# Patient Record
Sex: FEMALE | Race: WHITE
[De-identification: names, ages, dates, MRNs, and addresses within clinical notes are randomized per-mention and may not be internally consistent; named-entity substitution may affect disease eponyms.]

---

## 2018-04-15 ENCOUNTER — FORM ENCOUNTER (OUTPATIENT)
Age: 73
End: 2018-04-15

## 2018-04-16 ENCOUNTER — APPOINTMENT (OUTPATIENT)
Dept: FAMILY MEDICINE | Facility: CLINIC | Age: 73
End: 2018-04-16
Payer: MEDICARE

## 2018-04-16 ENCOUNTER — OUTPATIENT (OUTPATIENT)
Dept: OUTPATIENT SERVICES | Facility: HOSPITAL | Age: 73
LOS: 1 days | End: 2018-04-16
Payer: MEDICARE

## 2018-04-16 VITALS
HEART RATE: 65 BPM | DIASTOLIC BLOOD PRESSURE: 70 MMHG | BODY MASS INDEX: 26.08 KG/M2 | HEIGHT: 63.25 IN | RESPIRATION RATE: 16 BRPM | OXYGEN SATURATION: 100 % | SYSTOLIC BLOOD PRESSURE: 142 MMHG | WEIGHT: 149 LBS

## 2018-04-16 DIAGNOSIS — M16.11 UNILATERAL PRIMARY OSTEOARTHRITIS, RIGHT HIP: ICD-10-CM

## 2018-04-16 DIAGNOSIS — Z00.8 ENCOUNTER FOR OTHER GENERAL EXAMINATION: ICD-10-CM

## 2018-04-16 PROCEDURE — 73502 X-RAY EXAM HIP UNI 2-3 VIEWS: CPT

## 2018-04-16 PROCEDURE — 99214 OFFICE O/P EST MOD 30 MIN: CPT

## 2018-04-16 PROCEDURE — 73502 X-RAY EXAM HIP UNI 2-3 VIEWS: CPT | Mod: 26,RT

## 2018-04-16 RX ORDER — CHOLECALCIFEROL (VITAMIN D3) 25 MCG
TABLET ORAL
Refills: 0 | Status: ACTIVE | COMMUNITY

## 2018-04-16 RX ORDER — IBUPROFEN 800 MG/1
TABLET, FILM COATED ORAL
Refills: 0 | Status: ACTIVE | COMMUNITY

## 2018-04-16 RX ORDER — OMEPRAZOLE 20 MG/1
20 TABLET, DELAYED RELEASE ORAL
Qty: 60 | Refills: 1 | Status: ACTIVE | COMMUNITY
Start: 2018-04-16 | End: 1900-01-01

## 2018-04-16 RX ORDER — ACETAMINOPHEN 325 MG/1
TABLET, FILM COATED ORAL
Refills: 0 | Status: ACTIVE | COMMUNITY

## 2018-04-16 RX ORDER — MULTIVITAMIN
TABLET ORAL
Refills: 0 | Status: ACTIVE | COMMUNITY

## 2018-04-16 RX ORDER — MELOXICAM 15 MG/1
15 TABLET ORAL
Qty: 30 | Refills: 3 | Status: ACTIVE | COMMUNITY
Start: 2018-04-16 | End: 1900-01-01

## 2018-08-26 ENCOUNTER — TRANSCRIPTION ENCOUNTER (OUTPATIENT)
Age: 73
End: 2018-08-26

## 2019-11-18 ENCOUNTER — APPOINTMENT (OUTPATIENT)
Dept: FAMILY MEDICINE | Facility: CLINIC | Age: 74
End: 2019-11-18

## 2020-07-27 ENCOUNTER — APPOINTMENT (OUTPATIENT)
Dept: FAMILY MEDICINE | Facility: CLINIC | Age: 75
End: 2020-07-27
Payer: MEDICARE

## 2020-07-27 VITALS
DIASTOLIC BLOOD PRESSURE: 72 MMHG | TEMPERATURE: 98.7 F | SYSTOLIC BLOOD PRESSURE: 150 MMHG | WEIGHT: 130 LBS | BODY MASS INDEX: 22.75 KG/M2 | OXYGEN SATURATION: 98 % | HEIGHT: 63.25 IN | HEART RATE: 82 BPM

## 2020-07-27 DIAGNOSIS — N39.0 URINARY TRACT INFECTION, SITE NOT SPECIFIED: ICD-10-CM

## 2020-07-27 LAB
BILIRUB UR QL STRIP: NEGATIVE
CLARITY UR: CLEAR
COLLECTION METHOD: NORMAL
GLUCOSE UR-MCNC: NEGATIVE
HCG UR QL: 0.2 EU/DL
HGB UR QL STRIP.AUTO: NORMAL
KETONES UR-MCNC: NEGATIVE
LEUKOCYTE ESTERASE UR QL STRIP: NORMAL
NITRITE UR QL STRIP: NEGATIVE
PH UR STRIP: 5.5
PROT UR STRIP-MCNC: NEGATIVE
SP GR UR STRIP: 1.01

## 2020-07-27 PROCEDURE — 99214 OFFICE O/P EST MOD 30 MIN: CPT | Mod: 25

## 2020-07-27 PROCEDURE — 81002 URINALYSIS NONAUTO W/O SCOPE: CPT

## 2020-07-27 RX ORDER — CIPROFLOXACIN HYDROCHLORIDE 500 MG/1
500 TABLET, FILM COATED ORAL
Qty: 10 | Refills: 0 | Status: ACTIVE | COMMUNITY
Start: 2020-07-27 | End: 1900-01-01

## 2020-07-28 RX ORDER — LETROZOLE TABLETS 2.5 MG/1
2.5 TABLET, FILM COATED ORAL
Qty: 90 | Refills: 0 | Status: ACTIVE | COMMUNITY
Start: 2020-01-13

## 2020-07-30 LAB — BACTERIA UR CULT: NORMAL

## 2020-12-23 PROBLEM — N39.0 ACUTE UTI: Status: RESOLVED | Noted: 2020-07-27 | Resolved: 2020-12-23

## 2023-09-15 ENCOUNTER — NON-APPOINTMENT (OUTPATIENT)
Age: 78
End: 2023-09-15

## 2023-12-26 ENCOUNTER — NON-APPOINTMENT (OUTPATIENT)
Age: 78
End: 2023-12-26

## 2024-03-13 ENCOUNTER — APPOINTMENT (OUTPATIENT)
Dept: FAMILY MEDICINE | Facility: CLINIC | Age: 79
End: 2024-03-13
Payer: MEDICARE

## 2024-03-13 VITALS
OXYGEN SATURATION: 96 % | DIASTOLIC BLOOD PRESSURE: 80 MMHG | BODY MASS INDEX: 22.75 KG/M2 | RESPIRATION RATE: 16 BRPM | HEART RATE: 75 BPM | HEIGHT: 63.25 IN | WEIGHT: 130 LBS | TEMPERATURE: 98 F | SYSTOLIC BLOOD PRESSURE: 140 MMHG

## 2024-03-13 DIAGNOSIS — L08.9 LOCAL INFECTION OF THE SKIN AND SUBCUTANEOUS TISSUE, UNSPECIFIED: ICD-10-CM

## 2024-03-13 PROCEDURE — 99213 OFFICE O/P EST LOW 20 MIN: CPT

## 2024-03-13 RX ORDER — CEPHALEXIN 500 MG/1
500 TABLET ORAL EVERY 6 HOURS
Qty: 28 | Refills: 0 | Status: ACTIVE | COMMUNITY
Start: 2024-03-13 | End: 1900-01-01

## 2024-03-13 NOTE — HISTORY OF PRESENT ILLNESS
[FreeTextEntry8] : 77yo female who presents to the office with complaint of toe infection.  Patient reports that since January 2024 she has been dealing with redness and pain in the right great toe. Reports progression of redness and pain over time. She was applying triple antibiotic cream and hydrogen peroxide without benefit. Does have history of toenail fungus. Denies striking the area or any trauma to region. Denies pus or drainage. Tender on the medial aspect of the nail.   Of note, patient recently given Augmentin for upper respiratory infection which she was able to tolerate well, even with history of amoxicillin allergy.

## 2024-03-13 NOTE — ASSESSMENT
[FreeTextEntry1] : #Toe infection - Presenting with two months of right great toe infection, worsening - Failed to improve with triple antibiotic ointment and hydrogen peroxide - Erythema of the cuticle and medial aspect of great toe with tenderness - Start Keflex - Recommend podiatry follow up

## 2024-03-13 NOTE — REVIEW OF SYSTEMS
[Fever] : no fever [Chills] : no chills [Itching] : no itching [Nail Changes] : nail changes [Skin Rash] : skin rash

## 2024-03-13 NOTE — PHYSICAL EXAM
[No Acute Distress] : no acute distress [Well-Appearing] : well-appearing [de-identified] : erythema at the cuticle and medial aspect of right great toe, no pus or drainage, tender

## 2024-03-13 NOTE — PHYSICAL EXAM
[No Acute Distress] : no acute distress [Well-Appearing] : well-appearing [de-identified] : erythema at the cuticle and medial aspect of right great toe, no pus or drainage, tender

## 2024-11-07 ENCOUNTER — NON-APPOINTMENT (OUTPATIENT)
Age: 79
End: 2024-11-07

## 2025-01-09 ENCOUNTER — OFFICE (OUTPATIENT)
Dept: URBAN - METROPOLITAN AREA CLINIC 12 | Facility: CLINIC | Age: 80
Setting detail: OPHTHALMOLOGY
End: 2025-01-09
Payer: MEDICARE

## 2025-01-09 DIAGNOSIS — H25.13: ICD-10-CM

## 2025-01-09 DIAGNOSIS — D31.30: ICD-10-CM

## 2025-01-09 PROCEDURE — 99204 OFFICE O/P NEW MOD 45 MIN: CPT | Performed by: OPHTHALMOLOGY

## 2025-01-09 PROCEDURE — 92250 FUNDUS PHOTOGRAPHY W/I&R: CPT | Performed by: OPHTHALMOLOGY

## 2025-01-09 ASSESSMENT — REFRACTION_MANIFEST
OS_AXIS: 076
OD_CYLINDER: -0.75
OS_SPHERE: PLANO
OS_VA1: 20/25-2
OD_SPHERE: -4.50
OS_CYLINDER: -0.75
OD_AXIS: 112

## 2025-01-09 ASSESSMENT — KERATOMETRY
OS_K1POWER_DIOPTERS: 43.50
OD_K1POWER_DIOPTERS: 43.50
OS_AXISANGLE_DEGREES: 139
OD_K2POWER_DIOPTERS: 44.00
OS_K2POWER_DIOPTERS: 43.75
OD_AXISANGLE_DEGREES: 066

## 2025-01-09 ASSESSMENT — REFRACTION_CURRENTRX
OD_OVR_VA: 20/
OS_ADD: +2.75
OD_AXIS: 092
OS_SPHERE: +0.25
OS_CYLINDER: -0.50
OD_ADD: +2.75
OS_AXIS: 084
OD_CYLINDER: -0.50
OD_VPRISM_DIRECTION: PROGS
OS_OVR_VA: 20/
OD_SPHERE: -0.75
OS_VPRISM_DIRECTION: PROGS

## 2025-01-09 ASSESSMENT — VISUAL ACUITY
OD_BCVA: 20/40-
OS_BCVA: 20/50-

## 2025-01-09 ASSESSMENT — TONOMETRY
OS_IOP_MMHG: 20
OD_IOP_MMHG: 20

## 2025-01-09 ASSESSMENT — CONFRONTATIONAL VISUAL FIELD TEST (CVF)
OS_FINDINGS: FULL
OD_FINDINGS: FULL

## 2025-01-09 ASSESSMENT — REFRACTION_AUTOREFRACTION
OS_AXIS: 076
OS_CYLINDER: -0.75
OS_SPHERE: PLANO
OD_AXIS: 112
OD_SPHERE: -4.50
OD_CYLINDER: -0.75

## 2025-02-10 ENCOUNTER — OFFICE (OUTPATIENT)
Dept: URBAN - METROPOLITAN AREA CLINIC 12 | Facility: CLINIC | Age: 80
Setting detail: OPHTHALMOLOGY
End: 2025-02-10
Payer: MEDICARE

## 2025-02-10 DIAGNOSIS — H25.11: ICD-10-CM

## 2025-02-10 DIAGNOSIS — H25.13: ICD-10-CM

## 2025-02-10 PROCEDURE — 92136 OPHTHALMIC BIOMETRY: CPT | Mod: TC | Performed by: OPHTHALMOLOGY

## 2025-02-10 PROCEDURE — 92136 OPHTHALMIC BIOMETRY: CPT | Mod: 26,RT | Performed by: OPHTHALMOLOGY

## 2025-02-10 PROCEDURE — 99213 OFFICE O/P EST LOW 20 MIN: CPT | Performed by: OPHTHALMOLOGY

## 2025-02-10 ASSESSMENT — REFRACTION_MANIFEST
OS_CYLINDER: -0.75
OD_SPHERE: -4.50
OS_VA1: 20/25-2
OD_AXIS: 112
OS_AXIS: 076
OD_CYLINDER: -0.75
OS_SPHERE: PLANO

## 2025-02-10 ASSESSMENT — KERATOMETRY
OS_K1POWER_DIOPTERS: 43.25
OS_AXISANGLE_DEGREES: 144
OD_K2POWER_DIOPTERS: 44.00
OD_K1POWER_DIOPTERS: 43.50
OD_AXISANGLE_DEGREES: 009
OS_K2POWER_DIOPTERS: 43.75

## 2025-02-10 ASSESSMENT — VISUAL ACUITY
OD_BCVA: 20/40-2
OS_BCVA: 20/60+

## 2025-02-10 ASSESSMENT — CONFRONTATIONAL VISUAL FIELD TEST (CVF)
OD_FINDINGS: FULL
OS_FINDINGS: FULL

## 2025-02-10 ASSESSMENT — REFRACTION_AUTOREFRACTION
OS_SPHERE: -0.25
OD_AXIS: 092
OD_SPHERE: -4.50
OS_AXIS: 075
OD_CYLINDER: -1.00
OS_CYLINDER: -0.75

## 2025-02-10 ASSESSMENT — REFRACTION_CURRENTRX
OD_ADD: +2.75
OS_VPRISM_DIRECTION: PROGS
OS_ADD: +2.75
OD_AXIS: 092
OS_OVR_VA: 20/
OD_SPHERE: -0.75
OD_CYLINDER: -0.50
OS_CYLINDER: -0.50
OS_AXIS: 084
OD_VPRISM_DIRECTION: PROGS
OS_SPHERE: +0.25
OD_OVR_VA: 20/

## 2025-02-10 ASSESSMENT — TONOMETRY
OS_IOP_MMHG: 20
OD_IOP_MMHG: 20

## 2025-02-13 ENCOUNTER — APPOINTMENT (OUTPATIENT)
Dept: FAMILY MEDICINE | Facility: CLINIC | Age: 80
End: 2025-02-13
Payer: MEDICARE

## 2025-02-13 VITALS
HEART RATE: 78 BPM | TEMPERATURE: 97.7 F | BODY MASS INDEX: 22.57 KG/M2 | SYSTOLIC BLOOD PRESSURE: 132 MMHG | DIASTOLIC BLOOD PRESSURE: 70 MMHG | WEIGHT: 129 LBS | OXYGEN SATURATION: 95 % | HEIGHT: 63.25 IN

## 2025-02-13 DIAGNOSIS — Z01.818 ENCOUNTER FOR OTHER PREPROCEDURAL EXAMINATION: ICD-10-CM

## 2025-02-13 DIAGNOSIS — H26.9 UNSPECIFIED CATARACT: ICD-10-CM

## 2025-02-13 DIAGNOSIS — M81.0 AGE-RELATED OSTEOPOROSIS W/OUT CURRENT PATHOLOGICAL FRACTURE: ICD-10-CM

## 2025-02-13 PROCEDURE — 99214 OFFICE O/P EST MOD 30 MIN: CPT

## 2025-02-13 RX ORDER — DENOSUMAB 60 MG/ML
60 INJECTION SUBCUTANEOUS
Qty: 1 | Refills: 0 | Status: ACTIVE | COMMUNITY
Start: 2025-02-13

## 2025-03-04 ENCOUNTER — ASC (OUTPATIENT)
Dept: URBAN - METROPOLITAN AREA SURGERY 8 | Facility: SURGERY | Age: 80
Setting detail: OPHTHALMOLOGY
End: 2025-03-04
Payer: MEDICARE

## 2025-03-04 DIAGNOSIS — H25.11: ICD-10-CM

## 2025-03-04 PROCEDURE — 68841 INSJ RX ELUT IMPLT LAC CANAL: CPT | Mod: RT | Performed by: OPHTHALMOLOGY

## 2025-03-04 PROCEDURE — 66984 XCAPSL CTRC RMVL W/O ECP: CPT | Mod: RT | Performed by: OPHTHALMOLOGY

## 2025-03-05 ENCOUNTER — RX ONLY (RX ONLY)
Age: 80
End: 2025-03-05

## 2025-03-05 ENCOUNTER — OFFICE (OUTPATIENT)
Dept: URBAN - METROPOLITAN AREA CLINIC 12 | Facility: CLINIC | Age: 80
Setting detail: OPHTHALMOLOGY
End: 2025-03-05
Payer: MEDICARE

## 2025-03-05 DIAGNOSIS — Z96.1: ICD-10-CM

## 2025-03-05 PROCEDURE — 99024 POSTOP FOLLOW-UP VISIT: CPT | Performed by: OPTOMETRIST

## 2025-03-05 ASSESSMENT — REFRACTION_CURRENTRX
OD_CYLINDER: -0.50
OS_SPHERE: +0.25
OS_ADD: +2.75
OD_AXIS: 092
OD_SPHERE: -0.75
OS_OVR_VA: 20/
OD_OVR_VA: 20/
OD_ADD: +2.75
OS_VPRISM_DIRECTION: PROGS
OD_VPRISM_DIRECTION: PROGS
OS_AXIS: 084
OS_CYLINDER: -0.50

## 2025-03-05 ASSESSMENT — REFRACTION_MANIFEST
OD_SPHERE: -4.50
OS_SPHERE: PLANO
OS_CYLINDER: -0.75
OS_VA1: 20/25-2
OD_AXIS: 112
OD_CYLINDER: -0.75
OS_AXIS: 076

## 2025-03-05 ASSESSMENT — VISUAL ACUITY
OS_BCVA: 20/25
OD_BCVA: 20/40-2

## 2025-03-05 ASSESSMENT — KERATOMETRY
OS_K2POWER_DIOPTERS: 44.00
OD_AXISANGLE_DEGREES: 127
OS_AXISANGLE_DEGREES: 134
OS_K1POWER_DIOPTERS: 43.50
OD_K1POWER_DIOPTERS: 44.00
OD_K2POWER_DIOPTERS: 45.00

## 2025-03-05 ASSESSMENT — REFRACTION_AUTOREFRACTION
OD_CYLINDER: -1.00
OD_SPHERE: -0.25
OS_CYLINDER: -0.50
OS_AXIS: 70
OD_AXIS: 20
OS_SPHERE: -0.25

## 2025-03-05 ASSESSMENT — CONFRONTATIONAL VISUAL FIELD TEST (CVF)
OD_FINDINGS: FULL
OS_FINDINGS: FULL

## 2025-03-10 ENCOUNTER — OFFICE (OUTPATIENT)
Dept: URBAN - METROPOLITAN AREA CLINIC 12 | Facility: CLINIC | Age: 80
Setting detail: OPHTHALMOLOGY
End: 2025-03-10
Payer: MEDICARE

## 2025-03-10 DIAGNOSIS — H25.12: ICD-10-CM

## 2025-03-10 PROCEDURE — 92136 OPHTHALMIC BIOMETRY: CPT | Mod: 26,LT | Performed by: OPHTHALMOLOGY

## 2025-03-10 ASSESSMENT — REFRACTION_CURRENTRX
OD_CYLINDER: -0.50
OD_AXIS: 092
OS_CYLINDER: -0.50
OD_SPHERE: -0.75
OS_ADD: +2.75
OS_AXIS: 084
OS_VPRISM_DIRECTION: PROGS
OD_OVR_VA: 20/
OS_SPHERE: +0.25
OD_ADD: +2.75
OD_VPRISM_DIRECTION: PROGS
OS_OVR_VA: 20/

## 2025-03-10 ASSESSMENT — REFRACTION_MANIFEST
OS_CYLINDER: -0.75
OD_SPHERE: -4.50
OS_AXIS: 076
OD_AXIS: 112
OS_VA1: 20/25-2
OD_CYLINDER: -0.75
OS_SPHERE: PLANO

## 2025-03-10 ASSESSMENT — TONOMETRY
OD_IOP_MMHG: 20
OS_IOP_MMHG: 20

## 2025-03-10 ASSESSMENT — REFRACTION_AUTOREFRACTION
OD_CYLINDER: -1.00
OS_SPHERE: -0.50
OS_AXIS: 68
OD_AXIS: 171
OS_CYLINDER: -0.75
OD_SPHERE: -0.50

## 2025-03-10 ASSESSMENT — CONFRONTATIONAL VISUAL FIELD TEST (CVF)
OS_FINDINGS: FULL
OD_FINDINGS: FULL

## 2025-03-10 ASSESSMENT — KERATOMETRY
OS_AXISANGLE_DEGREES: 110
OD_K2POWER_DIOPTERS: 45.25
OS_K2POWER_DIOPTERS: 44.00
OD_AXISANGLE_DEGREES: 82
OD_K1POWER_DIOPTERS: 43.75
OS_K1POWER_DIOPTERS: 43.50

## 2025-03-10 ASSESSMENT — VISUAL ACUITY
OS_BCVA: 20/50-2
OD_BCVA: 20/50

## 2025-03-18 ENCOUNTER — ASC (OUTPATIENT)
Dept: URBAN - METROPOLITAN AREA SURGERY 8 | Facility: SURGERY | Age: 80
Setting detail: OPHTHALMOLOGY
End: 2025-03-18
Payer: MEDICARE

## 2025-03-18 DIAGNOSIS — H25.12: ICD-10-CM

## 2025-03-18 PROCEDURE — 66984 XCAPSL CTRC RMVL W/O ECP: CPT | Mod: 79,LT | Performed by: OPHTHALMOLOGY

## 2025-03-18 PROCEDURE — 68841 INSJ RX ELUT IMPLT LAC CANAL: CPT | Mod: 79,LT | Performed by: OPHTHALMOLOGY

## 2025-03-19 ENCOUNTER — OFFICE (OUTPATIENT)
Dept: URBAN - METROPOLITAN AREA CLINIC 12 | Facility: CLINIC | Age: 80
Setting detail: OPHTHALMOLOGY
End: 2025-03-19
Payer: MEDICARE

## 2025-03-19 DIAGNOSIS — Z96.1: ICD-10-CM

## 2025-03-19 PROCEDURE — 99024 POSTOP FOLLOW-UP VISIT: CPT | Performed by: OPTOMETRIST

## 2025-03-19 ASSESSMENT — VISUAL ACUITY
OD_BCVA: 20/25-2
OS_BCVA: 20/40

## 2025-03-19 ASSESSMENT — REFRACTION_CURRENTRX
OD_VPRISM_DIRECTION: PROGS
OD_ADD: +2.75
OD_CYLINDER: -0.50
OS_ADD: +2.75
OD_OVR_VA: 20/
OS_OVR_VA: 20/
OS_SPHERE: +0.25
OD_SPHERE: -0.75
OS_AXIS: 084
OS_VPRISM_DIRECTION: PROGS
OD_AXIS: 092
OS_CYLINDER: -0.50

## 2025-03-19 ASSESSMENT — KERATOMETRY
OS_K2POWER_DIOPTERS: 44.00
OD_K2POWER_DIOPTERS: 44.50
OS_AXISANGLE_DEGREES: 064
OD_AXISANGLE_DEGREES: 059
OD_K1POWER_DIOPTERS: 43.75
OS_K1POWER_DIOPTERS: 43.50

## 2025-03-19 ASSESSMENT — REFRACTION_MANIFEST
OD_AXIS: 112
OD_SPHERE: -4.50
OS_SPHERE: PLANO
OS_CYLINDER: -0.75
OS_AXIS: 076
OD_CYLINDER: -0.75
OS_VA1: 20/25-2

## 2025-03-19 ASSESSMENT — REFRACTION_AUTOREFRACTION
OD_SPHERE: +0.25
OS_AXIS: 041
OD_AXIS: 132
OS_CYLINDER: -0.75
OS_SPHERE: +0.75
OD_CYLINDER: -1.00

## 2025-03-19 ASSESSMENT — CONFRONTATIONAL VISUAL FIELD TEST (CVF)
OS_FINDINGS: FULL
OD_FINDINGS: FULL

## 2025-03-19 ASSESSMENT — TONOMETRY
OS_IOP_MMHG: 21
OD_IOP_MMHG: 16

## 2025-03-27 ENCOUNTER — OFFICE (OUTPATIENT)
Dept: URBAN - METROPOLITAN AREA CLINIC 12 | Facility: CLINIC | Age: 80
Setting detail: OPHTHALMOLOGY
End: 2025-03-27
Payer: MEDICARE

## 2025-03-27 DIAGNOSIS — Z96.1: ICD-10-CM

## 2025-03-27 PROCEDURE — 99024 POSTOP FOLLOW-UP VISIT: CPT | Performed by: OPHTHALMOLOGY

## 2025-03-27 ASSESSMENT — REFRACTION_CURRENTRX
OS_CYLINDER: -0.50
OS_ADD: +2.75
OS_VPRISM_DIRECTION: PROGS
OS_AXIS: 084
OD_ADD: +2.75
OD_CYLINDER: -0.50
OD_SPHERE: -0.75
OS_SPHERE: +0.25
OS_OVR_VA: 20/
OD_VPRISM_DIRECTION: PROGS
OD_AXIS: 092
OD_OVR_VA: 20/

## 2025-03-27 ASSESSMENT — REFRACTION_MANIFEST
OS_AXIS: 076
OD_CYLINDER: -0.75
OS_SPHERE: PLANO
OD_AXIS: 112
OS_CYLINDER: -0.75
OS_VA1: 20/25-2
OD_SPHERE: -4.50

## 2025-03-27 ASSESSMENT — TONOMETRY
OD_IOP_MMHG: 16
OS_IOP_MMHG: 17

## 2025-03-27 ASSESSMENT — KERATOMETRY
OS_K1POWER_DIOPTERS: 43.75
OD_K1POWER_DIOPTERS: 43.75
OS_AXISANGLE_DEGREES: 115
OD_K2POWER_DIOPTERS: 44.50
OS_K2POWER_DIOPTERS: 44.50
OD_AXISANGLE_DEGREES: 064

## 2025-03-27 ASSESSMENT — VISUAL ACUITY
OD_BCVA: 20/40-1
OS_BCVA: 20/25-2

## 2025-03-27 ASSESSMENT — REFRACTION_AUTOREFRACTION
OS_SPHERE: +0.25
OD_AXIS: 124
OD_CYLINDER: -1.00
OS_CYLINDER: -1.00
OS_AXIS: 047
OD_SPHERE: +0.25

## 2025-03-27 ASSESSMENT — CONFRONTATIONAL VISUAL FIELD TEST (CVF)
OD_FINDINGS: FULL
OS_FINDINGS: FULL

## 2025-04-11 ASSESSMENT — KERATOMETRY
OS_K2POWER_DIOPTERS: 43.75
OS_AXISANGLE_DEGREES: 139
OD_AXISANGLE_DEGREES: 066
OD_K1POWER_DIOPTERS: 43.50
OD_K2POWER_DIOPTERS: 44.00
OS_K1POWER_DIOPTERS: 43.50

## 2025-04-11 ASSESSMENT — REFRACTION_MANIFEST
OS_VA1: 20/25-2
OD_CYLINDER: -0.75
OS_AXIS: 076
OD_SPHERE: -4.50
OS_SPHERE: PLANO
OD_AXIS: 112
OS_CYLINDER: -0.75

## 2025-04-11 ASSESSMENT — REFRACTION_CURRENTRX
OD_VPRISM_DIRECTION: PROGS
OS_OVR_VA: 20/
OS_CYLINDER: -0.50
OD_CYLINDER: -0.50
OS_SPHERE: +0.25
OS_AXIS: 084
OD_AXIS: 092
OD_SPHERE: -0.75
OD_OVR_VA: 20/
OD_ADD: +2.75
OS_VPRISM_DIRECTION: PROGS
OS_ADD: +2.75

## 2025-04-16 ENCOUNTER — RX ONLY (RX ONLY)
Age: 80
End: 2025-04-16

## 2025-04-16 ENCOUNTER — OFFICE (OUTPATIENT)
Dept: URBAN - METROPOLITAN AREA CLINIC 12 | Facility: CLINIC | Age: 80
Setting detail: OPHTHALMOLOGY
End: 2025-04-16
Payer: MEDICARE

## 2025-04-16 DIAGNOSIS — Z96.1: ICD-10-CM

## 2025-04-16 DIAGNOSIS — H16.223: ICD-10-CM

## 2025-04-16 DIAGNOSIS — H01.004: ICD-10-CM

## 2025-04-16 DIAGNOSIS — H01.002: ICD-10-CM

## 2025-04-16 DIAGNOSIS — H01.001: ICD-10-CM

## 2025-04-16 DIAGNOSIS — H01.005: ICD-10-CM

## 2025-04-16 PROBLEM — H52.7 REFRACTIVE ERROR: Status: ACTIVE | Noted: 2025-04-16

## 2025-04-16 PROCEDURE — 99024 POSTOP FOLLOW-UP VISIT: CPT | Performed by: OPTOMETRIST

## 2025-04-16 ASSESSMENT — VISUAL ACUITY
OD_BCVA: 20/40-1
OS_BCVA: 20/30

## 2025-04-16 ASSESSMENT — REFRACTION_MANIFEST
OD_AXIS: 000
OS_SPHERE: PLANO
OS_AXIS: 060
OS_CYLINDER: -0.50
OD_VA1: 20/20-
OD_SPHERE: PLANO
OD_ADD: +2.25
OS_VA1: 20/20-
OD_CYLINDER: SPHERE
OS_ADD: +2.25

## 2025-04-16 ASSESSMENT — REFRACTION_CURRENTRX
OD_VPRISM_DIRECTION: PROGS
OS_VPRISM_DIRECTION: PROGS
OD_SPHERE: -0.75
OD_OVR_VA: 20/
OD_AXIS: 092
OS_AXIS: 084
OD_CYLINDER: -0.50
OS_ADD: +2.75
OS_OVR_VA: 20/
OD_ADD: +2.75
OS_SPHERE: +0.25
OS_CYLINDER: -0.50

## 2025-04-16 ASSESSMENT — LID EXAM ASSESSMENTS
OS_BLEPHARITIS: LLL LUL 1+
OD_BLEPHARITIS: RLL RUL 1+

## 2025-04-16 ASSESSMENT — KERATOMETRY
OS_K1POWER_DIOPTERS: 43.75
OD_AXISANGLE_DEGREES: 059
OD_K2POWER_DIOPTERS: 44.25
OS_K2POWER_DIOPTERS: 44.50
OS_AXISANGLE_DEGREES: 109
OD_K1POWER_DIOPTERS: 44.00

## 2025-04-16 ASSESSMENT — SUPERFICIAL PUNCTATE KERATITIS (SPK)
OD_SPK: 1+
OS_SPK: 1+

## 2025-04-16 ASSESSMENT — REFRACTION_AUTOREFRACTION
OS_CYLINDER: -1.00
OD_CYLINDER: -0.50
OD_AXIS: 119
OD_SPHERE: +0.25
OS_AXIS: 060
OS_SPHERE: +0.25

## 2025-04-16 ASSESSMENT — CONFRONTATIONAL VISUAL FIELD TEST (CVF)
OD_FINDINGS: FULL
OS_FINDINGS: FULL

## 2025-04-16 ASSESSMENT — TONOMETRY
OS_IOP_MMHG: 19
OD_IOP_MMHG: 18

## 2025-07-16 ENCOUNTER — OFFICE (OUTPATIENT)
Dept: URBAN - METROPOLITAN AREA CLINIC 12 | Facility: CLINIC | Age: 80
Setting detail: OPHTHALMOLOGY
End: 2025-07-16
Payer: MEDICARE

## 2025-07-16 DIAGNOSIS — H16.223: ICD-10-CM

## 2025-07-16 DIAGNOSIS — H01.001: ICD-10-CM

## 2025-07-16 DIAGNOSIS — H01.002: ICD-10-CM

## 2025-07-16 PROCEDURE — 92014 COMPRE OPH EXAM EST PT 1/>: CPT | Performed by: OPTOMETRIST

## 2025-07-16 ASSESSMENT — REFRACTION_CURRENTRX
OS_AXIS: 084
OS_OVR_VA: 20/
OD_OVR_VA: 20/
OD_CYLINDER: -0.50
OS_ADD: +2.75
OS_SPHERE: +0.25
OD_ADD: +2.75
OS_VPRISM_DIRECTION: PROGS
OD_AXIS: 092
OS_CYLINDER: -0.50
OD_SPHERE: -0.75
OD_VPRISM_DIRECTION: PROGS

## 2025-07-16 ASSESSMENT — REFRACTION_MANIFEST
OS_SPHERE: PLANO
OS_AXIS: 060
OS_ADD: +2.25
OD_VA1: 20/20-
OD_ADD: +2.25
OS_VA1: 20/20-
OD_AXIS: 000
OD_CYLINDER: SPHERE
OD_SPHERE: PLANO
OS_CYLINDER: -0.50

## 2025-07-16 ASSESSMENT — VISUAL ACUITY
OS_BCVA: 20/20-2
OD_BCVA: 20/20

## 2025-07-16 ASSESSMENT — SUPERFICIAL PUNCTATE KERATITIS (SPK)
OS_SPK: 1+
OD_SPK: 1+

## 2025-07-16 ASSESSMENT — LID EXAM ASSESSMENTS
OD_BLEPHARITIS: RLL RUL 1+
OS_BLEPHARITIS: LLL LUL 1+

## 2025-07-16 ASSESSMENT — KERATOMETRY
OD_AXISANGLE_DEGREES: 084
OD_K2POWER_DIOPTERS: 44.00
METHOD_AUTO_MANUAL: AUTO
OD_K1POWER_DIOPTERS: 43.75
OS_K1POWER_DIOPTERS: 43.50
OS_K2POWER_DIOPTERS: 43.75
OS_AXISANGLE_DEGREES: 138

## 2025-07-16 ASSESSMENT — REFRACTION_AUTOREFRACTION
OD_AXIS: 106
OD_SPHERE: +0.25
OS_SPHERE: +0.50
OS_AXIS: 058
OD_CYLINDER: -0.25
OS_CYLINDER: -0.50

## 2025-07-16 ASSESSMENT — TONOMETRY
OD_IOP_MMHG: 18
OS_IOP_MMHG: 19

## 2025-07-16 ASSESSMENT — CONFRONTATIONAL VISUAL FIELD TEST (CVF)
OS_FINDINGS: FULL
OD_FINDINGS: FULL